# Patient Record
(demographics unavailable — no encounter records)

---

## 2024-10-24 NOTE — PHYSICAL EXAM
[Alert] : alert [Obese] : obese [No Acute Distress] : no acute distress [Normal Voice/Communication] : normal voice communication [PERRL] : pupils equal, round and reactive to light [Normal Hearing] : hearing was normal [No Neck Mass] : no neck mass was observed [Thyroid Not Enlarged] : the thyroid was not enlarged [No Thyroid Nodules] : no palpable thyroid nodules [No Respiratory Distress] : no respiratory distress [Clear to Auscultation] : lungs were clear to auscultation bilaterally [Normal Rate] : heart rate was normal [Regular Rhythm] : with a regular rhythm [Normal Bowel Sounds] : normal bowel sounds [Soft] : abdomen soft [Normal Supraclavicular Nodes] : no supraclavicular lymphadenopathy [Normal Anterior Cervical Nodes] : no anterior cervical lymphadenopathy [Normal Posterior Cervical Nodes] : no posterior cervical lymphadenopathy [Normal Reflexes] : deep tendon reflexes were 2+ and symmetric [No Tremors] : no tremors [Oriented x3] : oriented to person, place, and time [Normal Affect] : the affect was normal [Normal Insight/Judgement] : insight and judgment were intact [Normal Mood] : the mood was normal

## 2024-10-24 NOTE — ASSESSMENT
[FreeTextEntry1] : 67 y.o. Female follows for management of thyroid nodules. She has Hx of osteoporosis with ONJ from Prolia.  Transfers care from Dr. Guidry.   # Multiple thyroid nodules Denies compressive symptoms, neck pain, sore throat or dysphagia Last US 10/3/23 with stable b/l nodules  Will repeat US now.  # Osteoporosis Hx of ONJ while on Prolia in 2018 - 2019 After that she was on Tymlos for 2 years followed by Actonel and IV Reclast (1 time in 2023) Now on Evenity - managed by Rheumatologist Feeling well Continue Ca and Vit D supplements.   # Hx of pituitary microadenoma 1.5 mm Not seen on the last brain MRI in 2023 Patient is asymptomatic. Previous pituitary BW - wnl Will repeat hormonal BW upon next visit Possible pituitary imaging if required.   # ?NCAH No adrenal abnormality on imaging Hormonal work up was significant for slightly elevated AM Cortisol  Also elevated 11 Deoxycortisol and 17 OHP 1 mg ODST reportedly with good response No clinical signs and symptoms of hypercortisolism Will repeat hormonal BW upon next visit. Would consider adrenal imaging if required  F/u in 6 months.

## 2024-10-24 NOTE — HISTORY OF PRESENT ILLNESS
[FreeTextEntry1] : 67 y.o. Female follows for management of thyroid nodules. She has Hx of osteoporosis with ONJ from Prolia.  Transfers care from Dr. Guidry.

## 2025-05-28 NOTE — PHYSICAL EXAM
[Alert] : alert [Obese] : obese [No Acute Distress] : no acute distress [Normal Voice/Communication] : normal voice communication [PERRL] : pupils equal, round and reactive to light [Normal Hearing] : hearing was normal [No Neck Mass] : no neck mass was observed [Thyroid Not Enlarged] : the thyroid was not enlarged [No Thyroid Nodules] : no palpable thyroid nodules [No Respiratory Distress] : no respiratory distress [Clear to Auscultation] : lungs were clear to auscultation bilaterally [Normal Rate] : heart rate was normal [Regular Rhythm] : with a regular rhythm [Normal Bowel Sounds] : normal bowel sounds [Soft] : abdomen soft [Normal Supraclavicular Nodes] : no supraclavicular lymphadenopathy [Normal Anterior Cervical Nodes] : no anterior cervical lymphadenopathy [Normal Reflexes] : deep tendon reflexes were 2+ and symmetric [No Tremors] : no tremors [Oriented x3] : oriented to person, place, and time [Normal Affect] : the affect was normal [Normal Insight/Judgement] : insight and judgment were intact [Normal Mood] : the mood was normal

## 2025-05-28 NOTE — REASON FOR VISIT
[Follow - Up] : a follow-up visit [Adrenal Evaluation/Adrenal Disorder] : adrenal evaluation/adrenal disorder [Pituitary Evaluation/ Disorder] : pituitary evaluation/disorder [Osteoporosis] : osteoporosis [Thyroid nodule/ MNG] : thyroid nodule/ MNG

## 2025-05-28 NOTE — HISTORY OF PRESENT ILLNESS
[FreeTextEntry1] : 68 y.o. Female follows for management of thyroid nodules. She has Hx of osteoporosis with ONJ from Prolia.  Transfers care from Dr. Guidry.